# Patient Record
Sex: MALE | Race: WHITE | NOT HISPANIC OR LATINO | Employment: OTHER | URBAN - METROPOLITAN AREA
[De-identification: names, ages, dates, MRNs, and addresses within clinical notes are randomized per-mention and may not be internally consistent; named-entity substitution may affect disease eponyms.]

---

## 2024-01-26 ENCOUNTER — HOSPITAL ENCOUNTER (EMERGENCY)
Facility: CLINIC | Age: 69
Discharge: HOME | End: 2024-01-26
Attending: EMERGENCY MEDICINE | Admitting: EMERGENCY MEDICINE
Payer: MEDICARE

## 2024-01-26 VITALS
OXYGEN SATURATION: 97 % | HEART RATE: 64 BPM | DIASTOLIC BLOOD PRESSURE: 73 MMHG | SYSTOLIC BLOOD PRESSURE: 136 MMHG | RESPIRATION RATE: 22 BRPM

## 2024-01-26 DIAGNOSIS — H53.9: ICD-10-CM

## 2024-01-26 LAB
ANION GAP SERPL CALCULATED.3IONS-SCNC: 13 MMOL/L (ref 7–15)
BASOPHILS # BLD AUTO: 0.1 10E3/UL (ref 0–0.2)
BASOPHILS NFR BLD AUTO: 1 %
BUN SERPL-MCNC: 14.5 MG/DL (ref 8–23)
CALCIUM SERPL-MCNC: 9.4 MG/DL (ref 8.8–10.2)
CHLORIDE SERPL-SCNC: 98 MMOL/L (ref 98–107)
CREAT SERPL-MCNC: 0.66 MG/DL (ref 0.67–1.17)
CRP SERPL-MCNC: 3.23 MG/L
DEPRECATED HCO3 PLAS-SCNC: 24 MMOL/L (ref 22–29)
EGFRCR SERPLBLD CKD-EPI 2021: >90 ML/MIN/1.73M2
EOSINOPHIL # BLD AUTO: 0.3 10E3/UL (ref 0–0.7)
EOSINOPHIL NFR BLD AUTO: 3 %
ERYTHROCYTE [DISTWIDTH] IN BLOOD BY AUTOMATED COUNT: 12.5 % (ref 10–15)
ERYTHROCYTE [SEDIMENTATION RATE] IN BLOOD BY WESTERGREN METHOD: 25 MM/HR (ref 0–20)
GLUCOSE SERPL-MCNC: 105 MG/DL (ref 70–99)
HCT VFR BLD AUTO: 41.9 % (ref 40–53)
HGB BLD-MCNC: 14.1 G/DL (ref 13.3–17.7)
IMM GRANULOCYTES # BLD: 0 10E3/UL
IMM GRANULOCYTES NFR BLD: 0 %
LYMPHOCYTES # BLD AUTO: 2.7 10E3/UL (ref 0.8–5.3)
LYMPHOCYTES NFR BLD AUTO: 28 %
MCH RBC QN AUTO: 29.9 PG (ref 26.5–33)
MCHC RBC AUTO-ENTMCNC: 33.7 G/DL (ref 31.5–36.5)
MCV RBC AUTO: 89 FL (ref 78–100)
MONOCYTES # BLD AUTO: 0.6 10E3/UL (ref 0–1.3)
MONOCYTES NFR BLD AUTO: 6 %
NEUTROPHILS # BLD AUTO: 6.2 10E3/UL (ref 1.6–8.3)
NEUTROPHILS NFR BLD AUTO: 62 %
NRBC # BLD AUTO: 0 10E3/UL
NRBC BLD AUTO-RTO: 0 /100
PLATELET # BLD AUTO: 353 10E3/UL (ref 150–450)
POTASSIUM SERPL-SCNC: 4 MMOL/L (ref 3.4–5.3)
RBC # BLD AUTO: 4.71 10E6/UL (ref 4.4–5.9)
SODIUM SERPL-SCNC: 135 MMOL/L (ref 135–145)
WBC # BLD AUTO: 9.8 10E3/UL (ref 4–11)

## 2024-01-26 PROCEDURE — 85004 AUTOMATED DIFF WBC COUNT: CPT | Performed by: EMERGENCY MEDICINE

## 2024-01-26 PROCEDURE — 36415 COLL VENOUS BLD VENIPUNCTURE: CPT | Performed by: EMERGENCY MEDICINE

## 2024-01-26 PROCEDURE — 99284 EMERGENCY DEPT VISIT MOD MDM: CPT

## 2024-01-26 PROCEDURE — 80048 BASIC METABOLIC PNL TOTAL CA: CPT | Performed by: EMERGENCY MEDICINE

## 2024-01-26 PROCEDURE — 85652 RBC SED RATE AUTOMATED: CPT | Performed by: EMERGENCY MEDICINE

## 2024-01-26 PROCEDURE — 93005 ELECTROCARDIOGRAM TRACING: CPT

## 2024-01-26 PROCEDURE — 86140 C-REACTIVE PROTEIN: CPT | Performed by: EMERGENCY MEDICINE

## 2024-01-26 ASSESSMENT — ACTIVITIES OF DAILY LIVING (ADL): ADLS_ACUITY_SCORE: 35

## 2024-01-27 LAB
ATRIAL RATE - MUSE: 72 BPM
DIASTOLIC BLOOD PRESSURE - MUSE: NORMAL MMHG
INTERPRETATION ECG - MUSE: NORMAL
P AXIS - MUSE: 62 DEGREES
PR INTERVAL - MUSE: 202 MS
QRS DURATION - MUSE: 96 MS
QT - MUSE: 404 MS
QTC - MUSE: 442 MS
R AXIS - MUSE: 54 DEGREES
SYSTOLIC BLOOD PRESSURE - MUSE: NORMAL MMHG
T AXIS - MUSE: 34 DEGREES
VENTRICULAR RATE- MUSE: 72 BPM

## 2024-01-27 NOTE — ED TRIAGE NOTES
Patient is being treated for glaucoma in both eyes, started a second drop in early January.  Four days ago he began having pain in the left eye, called his Dr today and Dr told him to come to the ED.  Also states he has some blurred vision from time to time but not at the moment.  Dr believes it could be temporal arteritis.     Triage Assessment (Adult)       Row Name 01/26/24 1320          Triage Assessment    Airway WDL WDL        Respiratory WDL    Respiratory WDL WDL        Skin Circulation/Temperature WDL    Skin Circulation/Temperature WDL WDL        Cardiac WDL    Cardiac WDL WDL        Peripheral/Neurovascular WDL    Peripheral Neurovascular WDL WDL        Cognitive/Neuro/Behavioral WDL    Cognitive/Neuro/Behavioral WDL WDL

## 2024-01-27 NOTE — ED PROVIDER NOTES
History     Chief Complaint:  Eye Problem       HPI   Kemar Santos is a 68 year old male self described hypochondriac that feels like very brief double vision when first thing awakening in the am and intermittently during the day if he shakes his head like cartoon character this goes away.  This is very brief.  No ady KHAN.  Has chronic neck pain right sided and now a little left.  No ear pain.  No painful movement of neck.  No tearing sensation.  No CP or SOb.  No fever or congestion or sore throat or trouble swallowing.  Has been seeing an eye doctor since nov and per patient had normal pressures of eyes but borderline so on 2 drops for glaucoma.  No eye redness.  No change with lighting.  Vision completely intact as is acuity.  No weakness sensation changes gait problems.  He called his eye doctor about this that is out of town in [place] and received phone call back end of day Friday and was told he has temporal arteritis and this has him very anxious.        Independent Historian:        Review of External Notes:      Medications:    cyclobenzaprine (FLEXERIL) 5 MG tablet  naproxen (NAPROSYN) 500 MG tablet  ORDER FOR DME        Past Medical History:    No past medical history on file.    Past Surgical History:    Past Surgical History:   Procedure Laterality Date    CHOLECYSTECTOMY      HC KNEE SCOPE,SINGLE MENISECTOMY Right 3/25/2016    Procedure: KNEE ARTHROSCOPY WITH PARTIAL MEDIAL & LATERAL MENISCECTOMY, RIGHT;  Surgeon: Melo Medeiros MD;  Location: [facility];  Service: Orthopedics          Physical Exam   Patient Vitals for the past 24 hrs:   BP Pulse Resp SpO2   01/26/24 1908 (!) 142/72 -- -- --   01/26/24 1904 -- 74 22 98 %        Physical Exam  Constitutional: Patient is well appearing. No distress.  Head: Atraumatic.  No temporal or jaw tenderness to palption  Mouth/Throat: Oropharynx is clear and moist. No oropharyngeal exudate.  Eyes: Conjunctivae and EOM are normal. No scleral  icterus.  Anterior chambers normal.  Pupils reactive.  No fluorescein uptake.  Eye pressures 17 bilateral.  Pupils equal and reactive.    Neck: Normal range of motion. Neck supple.  No meningismus.  No bruit.    Cardiovascular: Normal rate, regular rhythm, normal heart sounds and intact distal perfusion.   Pulmonary/Chest: Breath sounds normal. No respiratory distress.  Abdominal: Soft. Bowel sounds are normal. No distension. No tenderness. No rebound or guarding.   Musculoskeletal: Normal range of motion. No edema or tenderness.   Neurological: Alert and orientated to person, place, and time. No observable focal neuro deficit  Skin: Warm and dry. No rash noted. Not diaphoretic.      Emergency Department Course   ECG  NSR HR 72 no injury pattern or morphology changes.      Imaging:  No orders to display     Report per radiology    Laboratory:  Labs Ordered and Resulted from Time of ED Arrival to Time of ED Departure   BASIC METABOLIC PANEL - Abnormal       Result Value    Sodium 135      Potassium 4.0      Chloride 98      Carbon Dioxide (CO2) 24      Anion Gap 13      Urea Nitrogen 14.5      Creatinine 0.66 (*)     GFR Estimate >90      Calcium 9.4      Glucose 105 (*)    ERYTHROCYTE SEDIMENTATION RATE AUTO - Abnormal    Erythrocyte Sedimentation Rate 25 (*)    CRP INFLAMMATION - Normal    CRP Inflammation 3.23     CBC WITH PLATELETS AND DIFFERENTIAL    WBC Count 9.8      RBC Count 4.71      Hemoglobin 14.1      Hematocrit 41.9      MCV 89      MCH 29.9      MCHC 33.7      RDW 12.5      Platelet Count 353      % Neutrophils 62      % Lymphocytes 28      % Monocytes 6      % Eosinophils 3      % Basophils 1      % Immature Granulocytes 0      NRBCs per 100 WBC 0      Absolute Neutrophils 6.2      Absolute Lymphocytes 2.7      Absolute Monocytes 0.6      Absolute Eosinophils 0.3      Absolute Basophils 0.1      Absolute Immature Granulocytes 0.0      Absolute NRBCs 0.0          Procedures       Emergency Department  Course & Assessments:             Interventions:  Medications - No data to display     Assessments:      Independent Interpretation (X-rays, CTs, rhythm strip):      Consultations/Discussion of Management or Tests:         Social Determinants of Health affecting care:       Disposition:  The patient was discharged to home.     Impression & Plan        Medical Decision Making:  Pt has no ady signs or sx of temporal arteritis.  Has normal CRP that in lit has possibly been found to more sens and specific in this situation.  ESR<60.  He has no red flags headache.  IOP acceptable.  Does not appear glaucoma.  Neuro intact.  Not ill or infectious appearing.  Not symptomatic here.  No tap tenderness to of sinuses.  This does not appear to be an neurologic ophthalmologic or systemic emergency with results and exam at this time.  He needs to see his eye doctor in clinic next week and red flag return sx reviewed.      Diagnosis:    ICD-10-CM    1. Problem of visual accommodation  H53.9            Discharge Medications:  New Prescriptions    No medications on file            1/26/2024   Brett Madsen MD Stevens, Andrew C, MD  01/26/24 3329

## (undated) RX ORDER — TETRACAINE HYDROCHLORIDE 5 MG/ML
SOLUTION OPHTHALMIC
Status: DISPENSED
Start: 2024-01-26